# Patient Record
Sex: MALE | Race: WHITE | NOT HISPANIC OR LATINO | Employment: FULL TIME | URBAN - METROPOLITAN AREA
[De-identification: names, ages, dates, MRNs, and addresses within clinical notes are randomized per-mention and may not be internally consistent; named-entity substitution may affect disease eponyms.]

---

## 2020-12-16 ENCOUNTER — TELEPHONE (OUTPATIENT)
Dept: SCHEDULING | Facility: IMAGING CENTER | Age: 30
End: 2020-12-16

## 2021-01-05 ENCOUNTER — OFFICE VISIT (OUTPATIENT)
Dept: MEDICAL GROUP | Facility: MEDICAL CENTER | Age: 31
End: 2021-01-05
Payer: COMMERCIAL

## 2021-01-05 VITALS
RESPIRATION RATE: 17 BRPM | BODY MASS INDEX: 26.87 KG/M2 | TEMPERATURE: 98 F | WEIGHT: 198.41 LBS | SYSTOLIC BLOOD PRESSURE: 118 MMHG | OXYGEN SATURATION: 97 % | DIASTOLIC BLOOD PRESSURE: 74 MMHG | HEIGHT: 72 IN | HEART RATE: 78 BPM

## 2021-01-05 DIAGNOSIS — Z13.29 SCREENING FOR ENDOCRINE DISORDER: ICD-10-CM

## 2021-01-05 DIAGNOSIS — Z13.220 LIPID SCREENING: ICD-10-CM

## 2021-01-05 DIAGNOSIS — G47.9 SLEEP DISTURBANCE: ICD-10-CM

## 2021-01-05 DIAGNOSIS — R53.82 CHRONIC FATIGUE: ICD-10-CM

## 2021-01-05 DIAGNOSIS — F41.1 GAD (GENERALIZED ANXIETY DISORDER): ICD-10-CM

## 2021-01-05 PROCEDURE — 99204 OFFICE O/P NEW MOD 45 MIN: CPT | Performed by: NURSE PRACTITIONER

## 2021-01-05 SDOH — HEALTH STABILITY: MENTAL HEALTH: HOW OFTEN DO YOU HAVE A DRINK CONTAINING ALCOHOL?: MONTHLY OR LESS

## 2021-01-05 ASSESSMENT — ANXIETY QUESTIONNAIRES
6. BECOMING EASILY ANNOYED OR IRRITABLE: MORE THAN HALF THE DAYS
GAD7 TOTAL SCORE: 6
3. WORRYING TOO MUCH ABOUT DIFFERENT THINGS: SEVERAL DAYS
4. TROUBLE RELAXING: NOT AT ALL
5. BEING SO RESTLESS THAT IT IS HARD TO SIT STILL: NOT AT ALL
1. FEELING NERVOUS, ANXIOUS, OR ON EDGE: SEVERAL DAYS
2. NOT BEING ABLE TO STOP OR CONTROL WORRYING: SEVERAL DAYS
7. FEELING AFRAID AS IF SOMETHING AWFUL MIGHT HAPPEN: SEVERAL DAYS

## 2021-01-05 ASSESSMENT — PATIENT HEALTH QUESTIONNAIRE - PHQ9
3. TROUBLE FALLING OR STAYING ASLEEP OR SLEEPING TOO MUCH: 3
1. LITTLE INTEREST OR PLEASURE IN DOING THINGS: 1
8. MOVING OR SPEAKING SO SLOWLY THAT OTHER PEOPLE COULD HAVE NOTICED. OR THE OPPOSITE, BEING SO FIGETY OR RESTLESS THAT YOU HAVE BEEN MOVING AROUND A LOT MORE THAN USUAL: 0
7. TROUBLE CONCENTRATING ON THINGS, SUCH AS READING THE NEWSPAPER OR WATCHING TELEVISION: 0
6. FEELING BAD ABOUT YOURSELF - OR THAT YOU ARE A FAILURE OR HAVE LET YOURSELF OR YOUR FAMILY DOWN: 0
5. POOR APPETITE OR OVEREATING: 1
SUM OF ALL RESPONSES TO PHQ9 QUESTIONS 1 AND 2: 2
4. FEELING TIRED OR HAVING LITTLE ENERGY: 3
9. THOUGHTS THAT YOU WOULD BE BETTER OFF DEAD, OR OF HURTING YOURSELF: 0
SUM OF ALL RESPONSES TO PHQ QUESTIONS 1-9: 9
2. FEELING DOWN, DEPRESSED, IRRITABLE, OR HOPELESS: 1

## 2021-01-05 NOTE — ASSESSMENT & PLAN NOTE
Patient reports chronic history of anxiety dating back to early 20s.  Feels that this is stress related, job related.  He has been seeing a therapist regularly which has helped somewhat.  He prefers to avoid any medications.  No SI/HI, no history of manic behavior, no substance abuse  Patient Health Questionaire    Little interest or pleasure in doing things?: 1   Feeling down, depressed, or hopeless?: 1  Trouble falling or staying asleep, or sleeping too much? : 3  Feeling tired or having little energy? : 3  Poor appetite or overeating? : 1  Feeling bad about yourself - or that you are a failure or have let yourself or your family down? : 0  Trouble concentrating on things, such as reading the newspaper or watching television? : 0  Moving or speaking so slowly that other people could have noticed.  Or the opposite - being so fidgety or restless that you have been moving around alot more than usual. : 0  Thoughts that you would be better off dead, or of hurting yourself?: 0  Patient Health Questionnaire Score: 9    If depressive symptoms identified deferred to follow up visit unless specifically addressed in assesment and plan.    Interpretation of PHQ-9 Total Score   Score Severity   1-4 No Depression   5-9 Mild Depression   10-14 Moderate Depression   15-19 Moderately Severe Depression   20-27 Severe Depression    EDI-7 Questionnaire    Feeling nervous, anxious, or on edge: Several days  Not being able to sop or control worrying: Several days  Worrying too much about different things: Several days  Trouble relaxing: Not at all  Being so restless that it's hard to sit still: Not at all  Becoming easily annoyed or irritable: More than half the days  Feeling afraid as if something awful might happen: Several days  Total: 6    Interpretation of EDI 7 Total Score   Score Severity :  0-4 No Anxiety   5-9 Mild Anxiety  10-14 Moderate Anxiety  15-21 Severe Anxiety

## 2021-01-05 NOTE — ASSESSMENT & PLAN NOTE
"Patient reports chronic difficulty with sleep.  Tends to wake up multiple times through the night, sometimes his mind will start \"racing\" during the night and then he is unable to get back to sleep.  Possible this is anxiety related.  He has tried taking melatonin but found this to be mainly ineffective.  He prefers to avoid any medications  "

## 2021-01-05 NOTE — ASSESSMENT & PLAN NOTE
"Longstanding difficulty with fatigue, \"as long as I can remember\".  Constantly tired, never feels fully rested.  He does not sleep well and tends to wake up throughout the night which could be a contributing factor.  However, he denies any symptoms of sleep apnea including significant snoring, waking up gasping, waking up with his heart racing  He does report that he struggled with mild depression and anxiety for years as well, he acknowledges this could be a contributing factor.  He essentially wants to have some lab work done to rule out any other underlying etiology.  He has not had thyroid or testosterone testing in the past  No family history of thyroid disorder.  No sudden weight gain, constipation, heat or cold intolerance.  No muscle atrophy, loss of libido  "

## 2021-01-05 NOTE — PROGRESS NOTES
Chief Complaint   Patient presents with   • Establish Care     NP   • Requesting Labs     constant fatigue     Lele Salcedo is a 30 y.o. male here to establish care.  He is working for a  with [employer], lives with his jessicae    EDI (generalized anxiety disorder)  Patient reports chronic history of anxiety dating back to early 20s.  Feels that this is stress related, job related.  He has been seeing a therapist regularly which has helped somewhat.  He prefers to avoid any medications.  No SI/HI, no history of manic behavior, no substance abuse  Patient Health Questionaire    Little interest or pleasure in doing things?: 1   Feeling down, depressed, or hopeless?: 1  Trouble falling or staying asleep, or sleeping too much? : 3  Feeling tired or having little energy? : 3  Poor appetite or overeating? : 1  Feeling bad about yourself - or that you are a failure or have let yourself or your family down? : 0  Trouble concentrating on things, such as reading the newspaper or watching television? : 0  Moving or speaking so slowly that other people could have noticed.  Or the opposite - being so fidgety or restless that you have been moving around alot more than usual. : 0  Thoughts that you would be better off dead, or of hurting yourself?: 0  Patient Health Questionnaire Score: 9    If depressive symptoms identified deferred to follow up visit unless specifically addressed in assesment and plan.    Interpretation of PHQ-9 Total Score   Score Severity   1-4 No Depression   5-9 Mild Depression   10-14 Moderate Depression   15-19 Moderately Severe Depression   20-27 Severe Depression    EDI-7 Questionnaire    Feeling nervous, anxious, or on edge: Several days  Not being able to sop or control worrying: Several days  Worrying too much about different things: Several days  Trouble relaxing: Not at all  Being so restless that it's hard to sit still: Not at all  Becoming easily annoyed or irritable: More than  "half the days  Feeling afraid as if something awful might happen: Several days  Total: 6    Interpretation of EDI 7 Total Score   Score Severity :  0-4 No Anxiety   5-9 Mild Anxiety  10-14 Moderate Anxiety  15-21 Severe Anxiety        Chronic fatigue  Longstanding difficulty with fatigue, \"as long as I can remember\".  Constantly tired, never feels fully rested.  He does not sleep well and tends to wake up throughout the night which could be a contributing factor.  However, he denies any symptoms of sleep apnea including significant snoring, waking up gasping, waking up with his heart racing  He does report that he struggled with mild depression and anxiety for years as well, he acknowledges this could be a contributing factor.  He essentially wants to have some lab work done to rule out any other underlying etiology.  He has not had thyroid or testosterone testing in the past  No family history of thyroid disorder.  No sudden weight gain, constipation, heat or cold intolerance.  No muscle atrophy, loss of libido  No history of anemia.  Denies frequent headaches, muscle or joint pain, rash, unexplained fever    Sleep disturbance  Patient reports chronic difficulty with sleep.  Tends to wake up multiple times through the night, sometimes his mind will start \"racing\" during the night and then he is unable to get back to sleep.  Possible this is anxiety related.  He has tried taking melatonin but found this to be mainly ineffective.  He prefers to avoid any medications    Current medicines (including changes today)  No current outpatient medications on file.     No current facility-administered medications for this visit.      He  has no past medical history on file.  He  has a past surgical history that includes wrist arthroscopy (Right).  Social History     Tobacco Use   • Smoking status: Never Smoker   • Smokeless tobacco: Never Used   Substance Use Topics   • Alcohol use: Yes     Frequency: Monthly or less   • Drug " use: Yes     Types: Marijuana     Social History     Social History Narrative   • Not on file     Family History   Problem Relation Age of Onset   • No Known Problems Father    • Diabetes Maternal Grandfather      Family Status   Relation Name Status   • Mo  Alive   • Fa  (Not Specified)   • MGFa  Alive         ROS  Problems listed discussed above, all other systems reviewed and negative     Objective:     /74 (BP Location: Left arm, Patient Position: Sitting, BP Cuff Size: Adult)   Pulse 78   Temp 36.7 °C (98 °F) (Temporal)   Resp 17   Ht 1.829 m (6')   Wt 90 kg (198 lb 6.6 oz)   SpO2 97%  Body mass index is 26.91 kg/m².  Physical Exam:  General: Alert, oriented in no acute distress.  Eye contact is good, speech is normal, affect calm  HEENT: =TMs gray with good landmarks bilaterally. No cervical or supraclavicular lymphadenopathy, thyroid isthmus palpable without masses or nodules.  Lungs: clear to auscultation bilaterally, good aeration, normal effort. No wheeze/ rhonchi/ rales.  CV: regular rate and rhythm, S1, S2. No murmur, no JVD, no edema. Pedal pulses 2 + bilaterally  Abdomen: soft, nontender, BS x4.  Ext: color normal, vascularity normal, temperature normal. No rash or lesions.    Assessment and Plan:   The following treatment plan was discussed   1. Chronic fatigue   this is a moderately complex problem with many potential underlying causes.  We have discussed sleep apnea of which he denies any symptoms.  Anxiety and depression could also be a contributing factor.  At this time we will obtain labs to screen for underlying endocrine disorder or vitamin deficiency  TSH WITH REFLEX TO FT4    VITAMIN D,25 HYDROXY    Comp Metabolic Panel    TESTOSTERONE SERUM    CBC WITH DIFFERENTIAL   2. EDI (generalized anxiety disorder)   chronic issue, currently seeing a therapist.  He prefers to avoid medication at this time.  Continue to monitor.   3. Sleep disturbance   waking frequently through the night,  often having difficulty getting back to sleep.  He has tried melatonin without much benefit.  This is likely anxiety related.   4. Screening for endocrine disorder  TESTOSTERONE SERUM   5. Lipid screening  Lipid Profile       Followup: Pending labs           Please note that this dictation was created using voice recognition software. I have worked with consultants from the vendor as well as technical experts from [facility] to optimize the interface. I have made every reasonable attempt to correct obvious errors, but I expect that there are errors of grammar and possibly content that I did not discover before finalizing the note.

## 2021-02-11 ENCOUNTER — HOSPITAL ENCOUNTER (OUTPATIENT)
Dept: LAB | Facility: MEDICAL CENTER | Age: 31
End: 2021-02-11
Attending: NURSE PRACTITIONER
Payer: COMMERCIAL

## 2021-02-11 DIAGNOSIS — Z13.220 LIPID SCREENING: ICD-10-CM

## 2021-02-11 DIAGNOSIS — R53.82 CHRONIC FATIGUE: ICD-10-CM

## 2021-02-11 DIAGNOSIS — Z13.29 SCREENING FOR ENDOCRINE DISORDER: ICD-10-CM

## 2021-02-11 LAB
ALBUMIN SERPL BCP-MCNC: 4.7 G/DL (ref 3.2–4.9)
ALBUMIN/GLOB SERPL: 1.7 G/DL
ALP SERPL-CCNC: 90 U/L (ref 30–99)
ALT SERPL-CCNC: 29 U/L (ref 2–50)
ANION GAP SERPL CALC-SCNC: 13 MMOL/L (ref 7–16)
AST SERPL-CCNC: 21 U/L (ref 12–45)
BASOPHILS # BLD AUTO: 1.3 % (ref 0–1.8)
BASOPHILS # BLD: 0.09 K/UL (ref 0–0.12)
BILIRUB SERPL-MCNC: 0.4 MG/DL (ref 0.1–1.5)
BUN SERPL-MCNC: 14 MG/DL (ref 8–22)
CALCIUM SERPL-MCNC: 9.4 MG/DL (ref 8.4–10.2)
CHLORIDE SERPL-SCNC: 105 MMOL/L (ref 96–112)
CHOLEST SERPL-MCNC: 130 MG/DL (ref 100–199)
CO2 SERPL-SCNC: 22 MMOL/L (ref 20–33)
CREAT SERPL-MCNC: 0.92 MG/DL (ref 0.5–1.4)
EOSINOPHIL # BLD AUTO: 0.15 K/UL (ref 0–0.51)
EOSINOPHIL NFR BLD: 2.2 % (ref 0–6.9)
ERYTHROCYTE [DISTWIDTH] IN BLOOD BY AUTOMATED COUNT: 39.1 FL (ref 35.9–50)
FASTING STATUS PATIENT QL REPORTED: NORMAL
GLOBULIN SER CALC-MCNC: 2.7 G/DL (ref 1.9–3.5)
GLUCOSE SERPL-MCNC: 96 MG/DL (ref 65–99)
HCT VFR BLD AUTO: 45.5 % (ref 42–52)
HDLC SERPL-MCNC: 23 MG/DL
HGB BLD-MCNC: 15.5 G/DL (ref 14–18)
IMM GRANULOCYTES # BLD AUTO: 0.02 K/UL (ref 0–0.11)
IMM GRANULOCYTES NFR BLD AUTO: 0.3 % (ref 0–0.9)
LDLC SERPL CALC-MCNC: 85 MG/DL
LYMPHOCYTES # BLD AUTO: 1.82 K/UL (ref 1–4.8)
LYMPHOCYTES NFR BLD: 26.2 % (ref 22–41)
MCH RBC QN AUTO: 29.4 PG (ref 27–33)
MCHC RBC AUTO-ENTMCNC: 34.1 G/DL (ref 33.7–35.3)
MCV RBC AUTO: 86.3 FL (ref 81.4–97.8)
MONOCYTES # BLD AUTO: 0.39 K/UL (ref 0–0.85)
MONOCYTES NFR BLD AUTO: 5.6 % (ref 0–13.4)
NEUTROPHILS # BLD AUTO: 4.47 K/UL (ref 1.82–7.42)
NEUTROPHILS NFR BLD: 64.4 % (ref 44–72)
NRBC # BLD AUTO: 0 K/UL
NRBC BLD-RTO: 0 /100 WBC
PLATELET # BLD AUTO: 303 K/UL (ref 164–446)
PMV BLD AUTO: 9.4 FL (ref 9–12.9)
POTASSIUM SERPL-SCNC: 3.9 MMOL/L (ref 3.6–5.5)
PROT SERPL-MCNC: 7.4 G/DL (ref 6–8.2)
RBC # BLD AUTO: 5.27 M/UL (ref 4.7–6.1)
SODIUM SERPL-SCNC: 140 MMOL/L (ref 135–145)
TRIGL SERPL-MCNC: 111 MG/DL (ref 0–149)
TSH SERPL DL<=0.005 MIU/L-ACNC: 1.01 UIU/ML (ref 0.38–5.33)
WBC # BLD AUTO: 6.9 K/UL (ref 4.8–10.8)

## 2021-02-11 PROCEDURE — 84403 ASSAY OF TOTAL TESTOSTERONE: CPT

## 2021-02-11 PROCEDURE — 36415 COLL VENOUS BLD VENIPUNCTURE: CPT

## 2021-02-11 PROCEDURE — 84443 ASSAY THYROID STIM HORMONE: CPT

## 2021-02-11 PROCEDURE — 82306 VITAMIN D 25 HYDROXY: CPT

## 2021-02-11 PROCEDURE — 80053 COMPREHEN METABOLIC PANEL: CPT

## 2021-02-11 PROCEDURE — 80061 LIPID PANEL: CPT

## 2021-02-11 PROCEDURE — 85025 COMPLETE CBC W/AUTO DIFF WBC: CPT

## 2021-02-12 LAB
25(OH)D3 SERPL-MCNC: 19 NG/ML (ref 30–100)
TESTOST SERPL-MCNC: 423 NG/DL (ref 175–781)

## 2021-02-17 ENCOUNTER — TELEPHONE (OUTPATIENT)
Dept: MEDICAL GROUP | Facility: MEDICAL CENTER | Age: 31
End: 2021-02-17

## 2021-02-17 RX ORDER — ERGOCALCIFEROL 1.25 MG/1
50000 CAPSULE ORAL
Qty: 12 CAPSULE | Refills: 0 | Status: SHIPPED | OUTPATIENT
Start: 2021-02-17 | End: 2023-09-22

## 2021-02-17 NOTE — TELEPHONE ENCOUNTER
Phone Number Called: [phone] (home)     Call outcome: Spoke to patient regarding message below.    Message: pt called back to update pharmacy and to receive verbal lab results pt expressed understanding and had no further questions. Pharmacy updated in chart

## 2022-08-23 ENCOUNTER — OFFICE VISIT (OUTPATIENT)
Dept: URGENT CARE | Facility: PHYSICIAN GROUP | Age: 32
End: 2022-08-23
Payer: COMMERCIAL

## 2022-08-23 VITALS
BODY MASS INDEX: 25.19 KG/M2 | DIASTOLIC BLOOD PRESSURE: 70 MMHG | RESPIRATION RATE: 14 BRPM | WEIGHT: 186 LBS | SYSTOLIC BLOOD PRESSURE: 112 MMHG | TEMPERATURE: 97.9 F | OXYGEN SATURATION: 96 % | HEART RATE: 77 BPM | HEIGHT: 72 IN

## 2022-08-23 DIAGNOSIS — N30.01 ACUTE CYSTITIS WITH HEMATURIA: ICD-10-CM

## 2022-08-23 DIAGNOSIS — R30.0 DYSURIA: ICD-10-CM

## 2022-08-23 LAB
APPEARANCE UR: CLEAR
BILIRUB UR STRIP-MCNC: NEGATIVE MG/DL
COLOR UR AUTO: NORMAL
GLUCOSE UR STRIP.AUTO-MCNC: NEGATIVE MG/DL
KETONES UR STRIP.AUTO-MCNC: NEGATIVE MG/DL
LEUKOCYTE ESTERASE UR QL STRIP.AUTO: NEGATIVE
NITRITE UR QL STRIP.AUTO: NEGATIVE
PH UR STRIP.AUTO: 7 [PH] (ref 5–8)
PROT UR QL STRIP: NEGATIVE MG/DL
RBC UR QL AUTO: NORMAL
SP GR UR STRIP.AUTO: 1.01
UROBILINOGEN UR STRIP-MCNC: 0.2 MG/DL

## 2022-08-23 PROCEDURE — 81002 URINALYSIS NONAUTO W/O SCOPE: CPT | Performed by: NURSE PRACTITIONER

## 2022-08-23 PROCEDURE — 99213 OFFICE O/P EST LOW 20 MIN: CPT | Performed by: NURSE PRACTITIONER

## 2022-08-23 RX ORDER — PHENAZOPYRIDINE HYDROCHLORIDE 200 MG/1
200 TABLET, FILM COATED ORAL 3 TIMES DAILY
Qty: 6 TABLET | Refills: 0 | Status: SHIPPED | OUTPATIENT
Start: 2022-08-23 | End: 2022-08-25

## 2022-08-23 RX ORDER — CIPROFLOXACIN 500 MG/1
500 TABLET, FILM COATED ORAL EVERY 12 HOURS
Qty: 14 TABLET | Refills: 0 | Status: SHIPPED | OUTPATIENT
Start: 2022-08-23 | End: 2022-08-30

## 2022-08-23 ASSESSMENT — ENCOUNTER SYMPTOMS
WEAKNESS: 0
FLANK PAIN: 0
FEVER: 0
CONSTIPATION: 0
VOMITING: 0
BACK PAIN: 0
NAUSEA: 0
HEADACHES: 0
DIZZINESS: 0
MYALGIAS: 0
ABDOMINAL PAIN: 0
CHILLS: 0
DIARRHEA: 0

## 2022-08-23 ASSESSMENT — FIBROSIS 4 INDEX: FIB4 SCORE: 0.42

## 2022-08-23 NOTE — PROGRESS NOTES
Subjective     Lele Salcedo is a 32 y.o. male who presents with UTI (Freq urination, small amount of urine, x2 days )            HPI  States has been experiencing dysuria, urgency, frequency x2 days.  Denies fever, nausea vomiting back pain or hematuria.  Denies discharge from penis, scrotum swelling or pain.  Denies rash or redness at tip of penis.  Last sexual intercourse 4 months ago, no concern for STD at this time.  Not prone to urinary tract infections.    PMH:  has no past medical history on file.  MEDS:   Current Outpatient Medications:     ciprofloxacin (CIPRO) 500 MG Tab, Take 1 Tablet by mouth every 12 hours for 7 days., Disp: 14 Tablet, Rfl: 0    phenazopyridine (PYRIDIUM) 200 MG Tab, Take 1 Tablet by mouth 3 times a day for 2 days., Disp: 6 Tablet, Rfl: 0    vitamin D, Ergocalciferol, (DRISDOL) 1.25 MG (47607 UT) Cap capsule, Take 1 capsule by mouth every 7 days., Disp: 12 capsule, Rfl: 0  ALLERGIES:   Allergies   Allergen Reactions    Sulfa Drugs      SURGHX:   Past Surgical History:   Procedure Laterality Date    WRIST ARTHROSCOPY Right      SOCHX:  reports that he has never smoked. He has never used smokeless tobacco. He reports current alcohol use. He reports current drug use. Drug: Marijuana.  FH: Family history was reviewed, no pertinent findings to report    Review of Systems   Constitutional:  Negative for chills, fever and malaise/fatigue.   Gastrointestinal:  Negative for abdominal pain, constipation, diarrhea, nausea and vomiting.   Genitourinary:  Positive for dysuria, frequency and urgency. Negative for flank pain and hematuria.   Musculoskeletal:  Negative for back pain and myalgias.   Skin:  Negative for itching and rash.   Neurological:  Negative for dizziness, weakness and headaches.   All other systems reviewed and are negative.           Objective     /70 (BP Location: Right arm, Patient Position: Sitting, BP Cuff Size: Adult)   Pulse 77   Temp 36.6 °C (97.9 °F) (Temporal)    Resp 14   Ht 1.829 m (6')   Wt 84.4 kg (186 lb)   SpO2 96%   BMI 25.23 kg/m²      Physical Exam  Vitals reviewed.   Constitutional:       General: He is awake. He is not in acute distress.     Appearance: Normal appearance. He is well-developed. He is not ill-appearing, toxic-appearing or diaphoretic.   HENT:      Head: Normocephalic.   Cardiovascular:      Rate and Rhythm: Normal rate.   Pulmonary:      Effort: Pulmonary effort is normal.   Abdominal:      General: There is no distension.      Palpations: Abdomen is soft.      Tenderness: There is no abdominal tenderness. There is no guarding or rebound.   Musculoskeletal:         General: Normal range of motion.   Skin:     General: Skin is warm and dry.   Neurological:      Mental Status: He is alert and oriented to person, place, and time.   Psychiatric:         Attention and Perception: Attention normal.         Mood and Affect: Mood normal.         Speech: Speech normal.         Behavior: Behavior normal. Behavior is cooperative.                           Assessment & Plan        1. Dysuria    - POCT Urinalysis  - phenazopyridine (PYRIDIUM) 200 MG Tab; Take 1 Tablet by mouth 3 times a day for 2 days.  Dispense: 6 Tablet; Refill: 0    2. Acute cystitis with hematuria    - ciprofloxacin (CIPRO) 500 MG Tab; Take 1 Tablet by mouth every 12 hours for 7 days.  Dispense: 14 Tablet; Refill: 0     -Increase water intake  -Urinate more frequently and empty bladder completely  -Practice good toileting hygiene after urination and sexual intercourse, refrain from sexual intercourse until infection cleared  -Monitor for back/flank pain, difficulty urinating, blood in urine- need re-evaluation

## 2022-09-09 ENCOUNTER — HOSPITAL ENCOUNTER (OUTPATIENT)
Facility: MEDICAL CENTER | Age: 32
End: 2022-09-09
Payer: COMMERCIAL

## 2022-09-09 ENCOUNTER — OFFICE VISIT (OUTPATIENT)
Dept: URGENT CARE | Facility: PHYSICIAN GROUP | Age: 32
End: 2022-09-09
Payer: COMMERCIAL

## 2022-09-09 VITALS
HEIGHT: 72 IN | OXYGEN SATURATION: 96 % | TEMPERATURE: 97.4 F | WEIGHT: 195 LBS | HEART RATE: 74 BPM | RESPIRATION RATE: 16 BRPM | DIASTOLIC BLOOD PRESSURE: 68 MMHG | SYSTOLIC BLOOD PRESSURE: 115 MMHG | BODY MASS INDEX: 26.41 KG/M2

## 2022-09-09 DIAGNOSIS — R30.0 DYSURIA: ICD-10-CM

## 2022-09-09 PROBLEM — G47.00 INSOMNIA: Status: ACTIVE | Noted: 2018-03-07

## 2022-09-09 LAB
APPEARANCE UR: CLEAR
BILIRUB UR STRIP-MCNC: NORMAL MG/DL
COLOR UR AUTO: YELLOW
GLUCOSE UR STRIP.AUTO-MCNC: NORMAL MG/DL
KETONES UR STRIP.AUTO-MCNC: NORMAL MG/DL
LEUKOCYTE ESTERASE UR QL STRIP.AUTO: NORMAL
NITRITE UR QL STRIP.AUTO: NORMAL
PH UR STRIP.AUTO: 7 [PH] (ref 5–8)
PROT UR QL STRIP: NORMAL MG/DL
RBC UR QL AUTO: NORMAL
SP GR UR STRIP.AUTO: 10.1
UROBILINOGEN UR STRIP-MCNC: 0.2 MG/DL

## 2022-09-09 PROCEDURE — 99213 OFFICE O/P EST LOW 20 MIN: CPT

## 2022-09-09 PROCEDURE — 87086 URINE CULTURE/COLONY COUNT: CPT

## 2022-09-09 PROCEDURE — 81002 URINALYSIS NONAUTO W/O SCOPE: CPT

## 2022-09-09 RX ORDER — PHENAZOPYRIDINE HYDROCHLORIDE 200 MG/1
200 TABLET, FILM COATED ORAL 3 TIMES DAILY PRN
Qty: 6 TABLET | Refills: 0 | Status: SHIPPED | OUTPATIENT
Start: 2022-09-09 | End: 2023-07-27

## 2022-09-09 RX ORDER — CIPROFLOXACIN 500 MG/1
500 TABLET, FILM COATED ORAL 2 TIMES DAILY
Qty: 14 TABLET | Refills: 0 | Status: SHIPPED | OUTPATIENT
Start: 2022-09-09 | End: 2022-09-16

## 2022-09-09 RX ORDER — IBUPROFEN 600 MG/1
600 TABLET ORAL EVERY 6 HOURS PRN
Qty: 30 TABLET | Refills: 0 | Status: SHIPPED | OUTPATIENT
Start: 2022-09-09

## 2022-09-09 ASSESSMENT — FIBROSIS 4 INDEX: FIB4 SCORE: 0.42

## 2022-09-09 NOTE — PROGRESS NOTES
Subjective:   Lele Salcedo is a 32 y.o. male who presents for UTI (Started 2-3 days ago. )      HPI:  Treated for UTI 2 weeks ago with Cipro. Patient reports 2-3 days of symptom relief.   Started having gradual resumption: suprapubic pain, penis discomfort, urinary frequency,   Difficulty sleeping due to need to urinate  AZO and increased not helping.   Has history of kidney stones.  Denies sexual activity.  Denies fever, chills, nausea, vomiting, diarrhea  Denies scrotum pain.   Patient had vasectomy in April 2022, established with urologist. Patient denies post-op complications. Per patient, he denies post-op follow up, however, he states he did have viable sperm on microscope following procedure.    ROS as above per HPI    Medications:    Current Outpatient Medications on File Prior to Visit   Medication Sig Dispense Refill    vitamin D, Ergocalciferol, (DRISDOL) 1.25 MG (47520 UT) Cap capsule Take 1 capsule by mouth every 7 days. 12 capsule 0     No current facility-administered medications on file prior to visit.        Allergies:   Sulfa drugs    Problem List:   Patient Active Problem List   Diagnosis    Chronic fatigue    EDI (generalized anxiety disorder)    Sleep disturbance    Insomnia        Surgical History:  Past Surgical History:   Procedure Laterality Date    WRIST ARTHROSCOPY Right        Past Social Hx:   Social History     Tobacco Use    Smoking status: Never    Smokeless tobacco: Never   Vaping Use    Vaping Use: Never used   Substance Use Topics    Alcohol use: Yes    Drug use: Yes     Types: Marijuana          Problem list, medications, and allergies reviewed by myself today in Epic.     Objective:     /68 (BP Location: Left arm, Patient Position: Sitting, BP Cuff Size: Adult)   Pulse 74   Temp 36.3 °C (97.4 °F) (Temporal)   Resp 16   Ht 1.829 m (6')   Wt 88.5 kg (195 lb)   SpO2 96%   BMI 26.45 kg/m²     Physical Exam  Vitals and nursing note reviewed.   Constitutional:        General: He is not in acute distress.     Appearance: Normal appearance. He is not ill-appearing or diaphoretic.   HENT:      Head: Normocephalic and atraumatic.   Eyes:      Conjunctiva/sclera: Conjunctivae normal.      Pupils: Pupils are equal, round, and reactive to light.   Cardiovascular:      Rate and Rhythm: Normal rate and regular rhythm.      Heart sounds: Normal heart sounds.   Pulmonary:      Breath sounds: Normal breath sounds.   Abdominal:      General: Bowel sounds are normal.      Palpations: Abdomen is soft.      Tenderness: There is no right CVA tenderness or left CVA tenderness.   Genitourinary:     Penis: Normal and circumcised. No erythema, tenderness, discharge or swelling.       Testes: Normal.         Right: Tenderness or swelling not present.         Left: Tenderness or swelling not present.      Epididymis:      Right: Normal.      Left: Normal.   Skin:     General: Skin is warm and dry.      Findings: No rash.   Neurological:      Mental Status: He is alert and oriented to person, place, and time.     Results for orders placed or performed in visit on 09/09/22   POCT Urinalysis   Result Value Ref Range    POC Color YELLOW Negative    POC Appearance CLEAR Negative    POC Leukocyte Esterase NEG Negative    POC Nitrites NEG Negative    POC Urobiligen 0.2 Negative (0.2) mg/dL    POC Protein NEG Negative mg/dL    POC Urine PH 7.0 5.0 - 8.0    POC Blood TRACE-INTACT Negative    POC Specific Gravity 10.10 <1.005 - >1.030    POC Ketones NEG Negative mg/dL    POC Bilirubin NEG Negative mg/dL    POC Glucose NEG Negative mg/dL         Assessment/Plan:     Diagnosis and associated orders:   1. Dysuria  - POCT Urinalysis  - URINE CULTURE(NEW); Future  - ciprofloxacin (CIPRO) 500 MG Tab; Take 1 Tablet by mouth 2 times a day for 7 days.  Dispense: 14 Tablet; Refill: 0  - phenazopyridine (PYRIDIUM) 200 MG Tab; Take 1 Tablet by mouth 3 times a day as needed for Severe Pain.  Dispense: 6 Tablet; Refill: 0  -  ibuprofen (MOTRIN) 600 MG Tab; Take 1 Tablet by mouth every 6 hours as needed for Inflammation.  Dispense: 30 Tablet; Refill: 0      Comments/MDM:     UA obtained in urgent care today is positive for trace amounts of blood.  Negative for leukocytes and nitrites.  Will start patient on Cipro today and send urine out for culture.  Patient is to follow-up with his urologist regarding urinary complaints following vasectomy performed in April 2022.  Supportive measures otherwise encouraged.  Patient to follow-up with his primary care provider and to return to urgent care as needed.        Pt is clinically stable at today's acute urgent care visit.  No acute distress noted. Appropriate for outpatient management at this time.       Discussed DDx, management options (risks,benefits, and alternatives to planned treatment), natural progression and supportive care.  Expressed understanding and the treatment plan was agreed upon. Questions were encouraged and answered   Return to urgent care prn if new or worsening sx or if there is no improvement in condition prn.    Educated in Red flags and indications to immediately call 911 or present to the Emergency Department.   Advised the patient to follow-up with the primary care physician for recheck, reevaluation, and consideration of further management.      Please note that this dictation was created using voice recognition software. I have made a reasonable attempt to correct obvious errors, but I expect that there are errors of grammar and possibly content that I did not discover before finalizing the note.    This note was electronically signed by Harmony Caldera DNP

## 2022-09-11 LAB
BACTERIA UR CULT: NORMAL
SIGNIFICANT IND 70042: NORMAL
SITE SITE: NORMAL
SOURCE SOURCE: NORMAL

## 2023-07-27 ENCOUNTER — OFFICE VISIT (OUTPATIENT)
Dept: MEDICAL GROUP | Facility: PHYSICIAN GROUP | Age: 33
End: 2023-07-27
Payer: COMMERCIAL

## 2023-07-27 VITALS
HEART RATE: 83 BPM | TEMPERATURE: 97.7 F | SYSTOLIC BLOOD PRESSURE: 98 MMHG | WEIGHT: 191.1 LBS | HEIGHT: 72 IN | BODY MASS INDEX: 25.88 KG/M2 | OXYGEN SATURATION: 99 % | DIASTOLIC BLOOD PRESSURE: 60 MMHG

## 2023-07-27 DIAGNOSIS — Z23 NEED FOR VACCINATION: ICD-10-CM

## 2023-07-27 DIAGNOSIS — Z13.29 SCREENING FOR THYROID DISORDER: ICD-10-CM

## 2023-07-27 DIAGNOSIS — Z13.220 ENCOUNTER FOR SCREENING FOR LIPID DISORDER: ICD-10-CM

## 2023-07-27 DIAGNOSIS — Z13.21 ENCOUNTER FOR VITAMIN DEFICIENCY SCREENING: ICD-10-CM

## 2023-07-27 DIAGNOSIS — E55.9 VITAMIN D DEFICIENCY: ICD-10-CM

## 2023-07-27 DIAGNOSIS — Z01.84 IMMUNITY STATUS TESTING: ICD-10-CM

## 2023-07-27 DIAGNOSIS — G43.109 MIGRAINE WITH AURA AND WITHOUT STATUS MIGRAINOSUS, NOT INTRACTABLE: ICD-10-CM

## 2023-07-27 DIAGNOSIS — Z13.1 ENCOUNTER FOR SCREENING FOR DIABETES MELLITUS: ICD-10-CM

## 2023-07-27 PROCEDURE — 3078F DIAST BP <80 MM HG: CPT | Performed by: NURSE PRACTITIONER

## 2023-07-27 PROCEDURE — 99214 OFFICE O/P EST MOD 30 MIN: CPT | Performed by: NURSE PRACTITIONER

## 2023-07-27 PROCEDURE — 3074F SYST BP LT 130 MM HG: CPT | Performed by: NURSE PRACTITIONER

## 2023-07-27 SDOH — ECONOMIC STABILITY: INCOME INSECURITY: IN THE LAST 12 MONTHS, WAS THERE A TIME WHEN YOU WERE NOT ABLE TO PAY THE MORTGAGE OR RENT ON TIME?: NO

## 2023-07-27 SDOH — ECONOMIC STABILITY: HOUSING INSECURITY
IN THE LAST 12 MONTHS, WAS THERE A TIME WHEN YOU DID NOT HAVE A STEADY PLACE TO SLEEP OR SLEPT IN A SHELTER (INCLUDING NOW)?: NO

## 2023-07-27 SDOH — HEALTH STABILITY: MENTAL HEALTH
STRESS IS WHEN SOMEONE FEELS TENSE, NERVOUS, ANXIOUS, OR CAN'T SLEEP AT NIGHT BECAUSE THEIR MIND IS TROUBLED. HOW STRESSED ARE YOU?: TO SOME EXTENT

## 2023-07-27 SDOH — ECONOMIC STABILITY: INCOME INSECURITY: HOW HARD IS IT FOR YOU TO PAY FOR THE VERY BASICS LIKE FOOD, HOUSING, MEDICAL CARE, AND HEATING?: SOMEWHAT HARD

## 2023-07-27 SDOH — ECONOMIC STABILITY: HOUSING INSECURITY: IN THE LAST 12 MONTHS, HOW MANY PLACES HAVE YOU LIVED?: 1

## 2023-07-27 SDOH — HEALTH STABILITY: PHYSICAL HEALTH: ON AVERAGE, HOW MANY DAYS PER WEEK DO YOU ENGAGE IN MODERATE TO STRENUOUS EXERCISE (LIKE A BRISK WALK)?: 7 DAYS

## 2023-07-27 SDOH — ECONOMIC STABILITY: TRANSPORTATION INSECURITY
IN THE PAST 12 MONTHS, HAS LACK OF RELIABLE TRANSPORTATION KEPT YOU FROM MEDICAL APPOINTMENTS, MEETINGS, WORK OR FROM GETTING THINGS NEEDED FOR DAILY LIVING?: NO

## 2023-07-27 SDOH — ECONOMIC STABILITY: FOOD INSECURITY: WITHIN THE PAST 12 MONTHS, THE FOOD YOU BOUGHT JUST DIDN'T LAST AND YOU DIDN'T HAVE MONEY TO GET MORE.: NEVER TRUE

## 2023-07-27 SDOH — ECONOMIC STABILITY: FOOD INSECURITY: WITHIN THE PAST 12 MONTHS, YOU WORRIED THAT YOUR FOOD WOULD RUN OUT BEFORE YOU GOT MONEY TO BUY MORE.: NEVER TRUE

## 2023-07-27 SDOH — HEALTH STABILITY: PHYSICAL HEALTH: ON AVERAGE, HOW MANY MINUTES DO YOU ENGAGE IN EXERCISE AT THIS LEVEL?: 20 MIN

## 2023-07-27 SDOH — ECONOMIC STABILITY: TRANSPORTATION INSECURITY
IN THE PAST 12 MONTHS, HAS LACK OF TRANSPORTATION KEPT YOU FROM MEETINGS, WORK, OR FROM GETTING THINGS NEEDED FOR DAILY LIVING?: NO

## 2023-07-27 SDOH — ECONOMIC STABILITY: TRANSPORTATION INSECURITY
IN THE PAST 12 MONTHS, HAS THE LACK OF TRANSPORTATION KEPT YOU FROM MEDICAL APPOINTMENTS OR FROM GETTING MEDICATIONS?: NO

## 2023-07-27 ASSESSMENT — SOCIAL DETERMINANTS OF HEALTH (SDOH)
IN A TYPICAL WEEK, HOW MANY TIMES DO YOU TALK ON THE PHONE WITH FAMILY, FRIENDS, OR NEIGHBORS?: ONCE A WEEK
ARE YOU MARRIED, WIDOWED, DIVORCED, SEPARATED, NEVER MARRIED, OR LIVING WITH A PARTNER?: NEVER MARRIED
HOW OFTEN DO YOU HAVE SIX OR MORE DRINKS ON ONE OCCASION: LESS THAN MONTHLY
DO YOU BELONG TO ANY CLUBS OR ORGANIZATIONS SUCH AS CHURCH GROUPS UNIONS, FRATERNAL OR ATHLETIC GROUPS, OR SCHOOL GROUPS?: NO
DO YOU BELONG TO ANY CLUBS OR ORGANIZATIONS SUCH AS CHURCH GROUPS UNIONS, FRATERNAL OR ATHLETIC GROUPS, OR SCHOOL GROUPS?: NO
HOW OFTEN DO YOU GET TOGETHER WITH FRIENDS OR RELATIVES?: ONCE A WEEK
HOW OFTEN DO YOU ATTEND CHURCH OR RELIGIOUS SERVICES?: NEVER
ARE YOU MARRIED, WIDOWED, DIVORCED, SEPARATED, NEVER MARRIED, OR LIVING WITH A PARTNER?: NEVER MARRIED
WITHIN THE PAST 12 MONTHS, YOU WORRIED THAT YOUR FOOD WOULD RUN OUT BEFORE YOU GOT THE MONEY TO BUY MORE: NEVER TRUE
HOW OFTEN DO YOU ATTENT MEETINGS OF THE CLUB OR ORGANIZATION YOU BELONG TO?: NEVER
IN A TYPICAL WEEK, HOW MANY TIMES DO YOU TALK ON THE PHONE WITH FAMILY, FRIENDS, OR NEIGHBORS?: ONCE A WEEK
HOW OFTEN DO YOU HAVE A DRINK CONTAINING ALCOHOL: 2-4 TIMES A MONTH
HOW OFTEN DO YOU ATTENT MEETINGS OF THE CLUB OR ORGANIZATION YOU BELONG TO?: NEVER
HOW OFTEN DO YOU ATTEND CHURCH OR RELIGIOUS SERVICES?: NEVER
HOW OFTEN DO YOU GET TOGETHER WITH FRIENDS OR RELATIVES?: ONCE A WEEK
HOW HARD IS IT FOR YOU TO PAY FOR THE VERY BASICS LIKE FOOD, HOUSING, MEDICAL CARE, AND HEATING?: SOMEWHAT HARD
HOW MANY DRINKS CONTAINING ALCOHOL DO YOU HAVE ON A TYPICAL DAY WHEN YOU ARE DRINKING: 5 OR 6

## 2023-07-27 ASSESSMENT — PATIENT HEALTH QUESTIONNAIRE - PHQ9
CLINICAL INTERPRETATION OF PHQ2 SCORE: 2
SUM OF ALL RESPONSES TO PHQ QUESTIONS 1-9: 5
5. POOR APPETITE OR OVEREATING: 0 - NOT AT ALL

## 2023-07-27 ASSESSMENT — LIFESTYLE VARIABLES
SKIP TO QUESTIONS 9-10: 0
AUDIT-C TOTAL SCORE: 5
HOW MANY STANDARD DRINKS CONTAINING ALCOHOL DO YOU HAVE ON A TYPICAL DAY: 5 OR 6
HOW OFTEN DO YOU HAVE A DRINK CONTAINING ALCOHOL: 2-4 TIMES A MONTH
HOW OFTEN DO YOU HAVE SIX OR MORE DRINKS ON ONE OCCASION: LESS THAN MONTHLY

## 2023-07-27 NOTE — PROGRESS NOTES
Chief Complaint   Patient presents with    [facility]    Headache     Began a year and a half ago, and happens about once a month                                                                                                                                        HPI:   Lele presents today with the following.    Problem   Migraine With Aura and Without Status Migrainosus, Not Intractable       Current Outpatient Medications   Medication Sig Dispense Refill    ibuprofen (MOTRIN) 600 MG Tab Take 1 Tablet by mouth every 6 hours as needed for Inflammation. 30 Tablet 0    vitamin D, Ergocalciferol, (DRISDOL) 1.25 MG (39154 UT) Cap capsule Take 1 capsule by mouth every 7 days. 12 capsule 0     No current facility-administered medications for this visit.       Allergies as of 07/27/2023 - Reviewed 07/27/2023   Allergen Reaction Noted    Sulfa drugs  01/05/2021        ROS:  All systems negative expect as addressed in assessment and plan.     BP 98/60 (BP Location: Right arm, Patient Position: Sitting, BP Cuff Size: Adult)   Pulse 83   Temp 36.5 °C (97.7 °F) (Temporal)   Ht 1.829 m (6')   Wt 86.7 kg (191 lb 1.6 oz)   SpO2 99%   BMI 25.92 kg/m²       Physical Exam  Vitals reviewed.   Constitutional:       Appearance: Normal appearance.   HENT:      Head: Normocephalic and atraumatic.      Mouth/Throat:      Mouth: Mucous membranes are moist.   Eyes:      Extraocular Movements: Extraocular movements intact.      Conjunctiva/sclera: Conjunctivae normal.   Pulmonary:      Effort: Pulmonary effort is normal.   Musculoskeletal:         General: Normal range of motion.      Cervical back: Normal range of motion.   Skin:     General: Skin is warm and dry.   Neurological:      General: No focal deficit present.      Mental Status: He is alert and oriented to person, place, and time.   Psychiatric:         Mood and Affect: Mood normal.         Behavior: Behavior normal.         Thought Content: Thought content normal.            Assessment and Plan:  33 y.o. male with the following issues.    1. Migraine with aura and without status migrainosus, not intractable  Comp Metabolic Panel    Lipid Profile    FREE THYROXINE    TSH    VITAMIN D,25 HYDROXY (DEFICIENCY)    HEMOGLOBIN A1C    TRIIDOTHYRONINE    IRON/TOTAL IRON BIND    VITAMIN B12    FOLATE    FERRITIN    CBC WITHOUT DIFFERENTIAL      2. Screening for thyroid disorder  FREE THYROXINE    TSH      3. Encounter for screening for lipid disorder  Lipid Profile      4. Encounter for screening for diabetes mellitus  Comp Metabolic Panel    HEMOGLOBIN A1C      5. Need for vaccination        6. Vitamin D deficiency  VITAMIN D,25 HYDROXY (DEFICIENCY)      7. Encounter for vitamin deficiency screening  VITAMIN D,25 HYDROXY (DEFICIENCY)    IRON/TOTAL IRON BIND    VITAMIN B12    FOLATE    FERRITIN      8. Immunity status testing  HEP B SURFACE AB    HEP B SURFACE ANTIGEN    HEP B CORE AB TOTAL           Migraine with aura and without status migrainosus, not intractable  Chronic unstable. Patient reports that he has been having migraines for the last 1-2 years. He gets spots in his vision. He has sensitivity to light and sound. He denies any nausea, vomiting. His pain is primarily behind his eyes. He uses ibuprofen to try to help. He does smoke marijuana which he reports helps with his migraines.     Discussed causes and prevention.     Patient advised to alternate ibuprofen and excedrin. Patient advised supplements to help with migraine prevention. Will obtain labs to rule out additional causes of headaches.       Return in about 1 month (around 8/27/2023) for follow up for migraines.      Please note that this dictation was created using voice recognition software. I have worked with consultants from the vendor as well as technical experts from [employer] [employer] to optimize the interface. I have made every reasonable attempt to correct obvious errors, but I expect that there are errors of  grammar and possibly content that I did not discover before finalizing the note.

## 2023-07-27 NOTE — ASSESSMENT & PLAN NOTE
Chronic unstable. Patient reports that he has been having migraines for the last 1-2 years. He gets spots in his vision. He has sensitivity to light and sound. He denies any nausea, vomiting. His pain is primarily behind his eyes. He uses ibuprofen to try to help. He does smoke marijuana which he reports helps with his migraines.     Discussed causes and prevention.     Patient advised to alternate ibuprofen and excedrin. Patient advised supplements to help with migraine prevention. Will obtain labs to rule out additional causes of headaches.

## 2023-08-23 ENCOUNTER — HOSPITAL ENCOUNTER (OUTPATIENT)
Dept: LAB | Facility: MEDICAL CENTER | Age: 33
End: 2023-08-23
Attending: NURSE PRACTITIONER
Payer: COMMERCIAL

## 2023-08-23 DIAGNOSIS — E55.9 VITAMIN D DEFICIENCY: ICD-10-CM

## 2023-08-23 DIAGNOSIS — Z13.220 ENCOUNTER FOR SCREENING FOR LIPID DISORDER: ICD-10-CM

## 2023-08-23 DIAGNOSIS — G43.109 MIGRAINE WITH AURA AND WITHOUT STATUS MIGRAINOSUS, NOT INTRACTABLE: ICD-10-CM

## 2023-08-23 DIAGNOSIS — Z13.1 ENCOUNTER FOR SCREENING FOR DIABETES MELLITUS: ICD-10-CM

## 2023-08-23 DIAGNOSIS — Z13.21 ENCOUNTER FOR VITAMIN DEFICIENCY SCREENING: ICD-10-CM

## 2023-08-23 DIAGNOSIS — Z13.29 SCREENING FOR THYROID DISORDER: ICD-10-CM

## 2023-08-23 DIAGNOSIS — Z01.84 IMMUNITY STATUS TESTING: ICD-10-CM

## 2023-08-23 LAB
25(OH)D3 SERPL-MCNC: 24 NG/ML (ref 30–100)
ALBUMIN SERPL BCP-MCNC: 4.8 G/DL (ref 3.2–4.9)
ALBUMIN/GLOB SERPL: 1.7 G/DL
ALP SERPL-CCNC: 74 U/L (ref 30–99)
ALT SERPL-CCNC: 29 U/L (ref 2–50)
ANION GAP SERPL CALC-SCNC: 13 MMOL/L (ref 7–16)
AST SERPL-CCNC: 23 U/L (ref 12–45)
BILIRUB SERPL-MCNC: 0.4 MG/DL (ref 0.1–1.5)
BUN SERPL-MCNC: 17 MG/DL (ref 8–22)
CALCIUM ALBUM COR SERPL-MCNC: 9 MG/DL (ref 8.5–10.5)
CALCIUM SERPL-MCNC: 9.6 MG/DL (ref 8.5–10.5)
CHLORIDE SERPL-SCNC: 104 MMOL/L (ref 96–112)
CHOLEST SERPL-MCNC: 133 MG/DL (ref 100–199)
CO2 SERPL-SCNC: 23 MMOL/L (ref 20–33)
CREAT SERPL-MCNC: 0.97 MG/DL (ref 0.5–1.4)
ERYTHROCYTE [DISTWIDTH] IN BLOOD BY AUTOMATED COUNT: 40.5 FL (ref 35.9–50)
EST. AVERAGE GLUCOSE BLD GHB EST-MCNC: 111 MG/DL
FASTING STATUS PATIENT QL REPORTED: NORMAL
FERRITIN SERPL-MCNC: 141 NG/ML (ref 22–322)
FOLATE SERPL-MCNC: 28.7 NG/ML
GFR SERPLBLD CREATININE-BSD FMLA CKD-EPI: 105 ML/MIN/1.73 M 2
GLOBULIN SER CALC-MCNC: 2.9 G/DL (ref 1.9–3.5)
GLUCOSE SERPL-MCNC: 102 MG/DL (ref 65–99)
HBA1C MFR BLD: 5.5 % (ref 4–5.6)
HBV CORE AB SERPL QL IA: NONREACTIVE
HBV SURFACE AB SERPL IA-ACNC: 3.65 MIU/ML (ref 0–10)
HBV SURFACE AG SER QL: NORMAL
HCT VFR BLD AUTO: 46.6 % (ref 42–52)
HDLC SERPL-MCNC: 24 MG/DL
HGB BLD-MCNC: 15.7 G/DL (ref 14–18)
IRON SATN MFR SERPL: 22 % (ref 15–55)
IRON SERPL-MCNC: 66 UG/DL (ref 50–180)
LDLC SERPL CALC-MCNC: 90 MG/DL
MCH RBC QN AUTO: 29.5 PG (ref 27–33)
MCHC RBC AUTO-ENTMCNC: 33.7 G/DL (ref 32.3–36.5)
MCV RBC AUTO: 87.4 FL (ref 81.4–97.8)
PLATELET # BLD AUTO: 330 K/UL (ref 164–446)
PMV BLD AUTO: 9.6 FL (ref 9–12.9)
POTASSIUM SERPL-SCNC: 4.4 MMOL/L (ref 3.6–5.5)
PROT SERPL-MCNC: 7.7 G/DL (ref 6–8.2)
RBC # BLD AUTO: 5.33 M/UL (ref 4.7–6.1)
SODIUM SERPL-SCNC: 140 MMOL/L (ref 135–145)
T3 SERPL-MCNC: 157 NG/DL (ref 60–181)
T4 FREE SERPL-MCNC: 1.24 NG/DL (ref 0.93–1.7)
TIBC SERPL-MCNC: 304 UG/DL (ref 250–450)
TRIGL SERPL-MCNC: 96 MG/DL (ref 0–149)
TSH SERPL DL<=0.005 MIU/L-ACNC: 1.28 UIU/ML (ref 0.38–5.33)
UIBC SERPL-MCNC: 238 UG/DL (ref 110–370)
VIT B12 SERPL-MCNC: 431 PG/ML (ref 211–911)
WBC # BLD AUTO: 7.1 K/UL (ref 4.8–10.8)

## 2023-08-23 PROCEDURE — 80061 LIPID PANEL: CPT

## 2023-08-23 PROCEDURE — 84480 ASSAY TRIIODOTHYRONINE (T3): CPT

## 2023-08-23 PROCEDURE — 86706 HEP B SURFACE ANTIBODY: CPT

## 2023-08-23 PROCEDURE — 82607 VITAMIN B-12: CPT

## 2023-08-23 PROCEDURE — 83550 IRON BINDING TEST: CPT

## 2023-08-23 PROCEDURE — 80053 COMPREHEN METABOLIC PANEL: CPT

## 2023-08-23 PROCEDURE — 84439 ASSAY OF FREE THYROXINE: CPT

## 2023-08-23 PROCEDURE — 82728 ASSAY OF FERRITIN: CPT

## 2023-08-23 PROCEDURE — 36415 COLL VENOUS BLD VENIPUNCTURE: CPT

## 2023-08-23 PROCEDURE — 82306 VITAMIN D 25 HYDROXY: CPT

## 2023-08-23 PROCEDURE — 82746 ASSAY OF FOLIC ACID SERUM: CPT

## 2023-08-23 PROCEDURE — 87340 HEPATITIS B SURFACE AG IA: CPT

## 2023-08-23 PROCEDURE — 84443 ASSAY THYROID STIM HORMONE: CPT

## 2023-08-23 PROCEDURE — 83540 ASSAY OF IRON: CPT

## 2023-08-23 PROCEDURE — 86704 HEP B CORE ANTIBODY TOTAL: CPT

## 2023-08-23 PROCEDURE — 85027 COMPLETE CBC AUTOMATED: CPT

## 2023-08-23 PROCEDURE — 83036 HEMOGLOBIN GLYCOSYLATED A1C: CPT

## 2023-09-22 ENCOUNTER — OFFICE VISIT (OUTPATIENT)
Dept: MEDICAL GROUP | Facility: PHYSICIAN GROUP | Age: 33
End: 2023-09-22
Payer: COMMERCIAL

## 2023-09-22 VITALS
HEART RATE: 72 BPM | OXYGEN SATURATION: 97 % | DIASTOLIC BLOOD PRESSURE: 74 MMHG | BODY MASS INDEX: 25.95 KG/M2 | SYSTOLIC BLOOD PRESSURE: 110 MMHG | RESPIRATION RATE: 16 BRPM | HEIGHT: 72 IN | TEMPERATURE: 97 F | WEIGHT: 191.6 LBS

## 2023-09-22 DIAGNOSIS — H65.91 MIDDLE EAR EFFUSION, RIGHT: ICD-10-CM

## 2023-09-22 DIAGNOSIS — H92.01 RIGHT EAR PAIN: ICD-10-CM

## 2023-09-22 PROCEDURE — 99213 OFFICE O/P EST LOW 20 MIN: CPT | Performed by: NURSE PRACTITIONER

## 2023-09-22 PROCEDURE — 3078F DIAST BP <80 MM HG: CPT | Performed by: NURSE PRACTITIONER

## 2023-09-22 PROCEDURE — 3074F SYST BP LT 130 MM HG: CPT | Performed by: NURSE PRACTITIONER

## 2023-09-22 RX ORDER — PREDNISONE 20 MG/1
40 TABLET ORAL DAILY
Qty: 6 TABLET | Refills: 0 | Status: SHIPPED | OUTPATIENT
Start: 2023-09-22 | End: 2023-09-25

## 2023-09-22 RX ORDER — PSEUDOEPHEDRINE HCL 120 MG/1
120 TABLET, FILM COATED, EXTENDED RELEASE ORAL 2 TIMES DAILY
Qty: 6 TABLET | Refills: 0 | Status: SHIPPED | OUTPATIENT
Start: 2023-09-22

## 2023-09-22 ASSESSMENT — FIBROSIS 4 INDEX: FIB4 SCORE: 0.44

## 2023-09-22 NOTE — PROGRESS NOTES
Chief Complaint   Patient presents with    Ear Fullness     Right mild pain x 4-5 days                                                                                                                                       HPI:   Lele presents today with the following.    Problem   Right Ear Pain       Current Outpatient Medications   Medication Sig Dispense Refill    predniSONE (DELTASONE) 20 MG Tab Take 2 Tablets by mouth every day for 3 days. 6 Tablet 0    pseudoephedrine SR (SUDAFED 12 HOUR) 120 MG TABLET SR 12 HR Take 1 Tablet by mouth 2 times a day. 6 Tablet 0    ibuprofen (MOTRIN) 600 MG Tab Take 1 Tablet by mouth every 6 hours as needed for Inflammation. 30 Tablet 0     No current facility-administered medications for this visit.       Allergies as of 09/22/2023 - Reviewed 09/22/2023   Allergen Reaction Noted    Sulfa drugs  01/05/2021        ROS:  All systems negative expect as addressed in assessment and plan.     /74 (BP Location: Right arm, Patient Position: Sitting, BP Cuff Size: Adult)   Pulse 72   Temp 36.1 °C (97 °F) (Temporal)   Resp 16   Ht 1.829 m (6')   Wt 86.9 kg (191 lb 9.6 oz)   SpO2 97%   BMI 25.99 kg/m²       Physical Exam  Vitals reviewed.   Constitutional:       Appearance: Normal appearance.   HENT:      Head: Normocephalic and atraumatic.      Right Ear: A middle ear effusion is present. Tympanic membrane is injected and bulging.      Left Ear: A middle ear effusion is present.      Mouth/Throat:      Mouth: Mucous membranes are moist.   Eyes:      Extraocular Movements: Extraocular movements intact.      Conjunctiva/sclera: Conjunctivae normal.   Pulmonary:      Effort: Pulmonary effort is normal.   Musculoskeletal:         General: Normal range of motion.      Cervical back: Normal range of motion.   Skin:     General: Skin is warm and dry.   Neurological:      General: No focal deficit present.      Mental Status: He is alert and oriented to person, place, and time.    Psychiatric:         Mood and Affect: Mood normal.         Behavior: Behavior normal.         Thought Content: Thought content normal.         Assessment and Plan:  33 y.o. male with the following issues.    1. Right ear pain  predniSONE (DELTASONE) 20 MG Tab    pseudoephedrine SR (SUDAFED 12 HOUR) 120 MG TABLET SR 12 HR      2. Middle ear effusion, right  predniSONE (DELTASONE) 20 MG Tab    pseudoephedrine SR (SUDAFED 12 HOUR) 120 MG TABLET SR 12 HR           Right ear pain  Patient reports right ear pain for the last 5 days. He states he has been having popping and pressure. Denies drainage or fever. He denies any recent illness. He reports that he woke up with a sore throat.     Will treat with prednisone for 3 days and sudafed for 3 days. Patient to use flonase or triamcinolone nasal spray.         Return if symptoms worsen or fail to improve.      Please note that this dictation was created using voice recognition software. I have worked with consultants from the vendor as well as technical experts from [facility] to optimize the interface. I have made every reasonable attempt to correct obvious errors, but I expect that there are errors of grammar and possibly content that I did not discover before finalizing the note.

## 2023-09-22 NOTE — ASSESSMENT & PLAN NOTE
Patient reports right ear pain for the last 5 days. He states he has been having popping and pressure. Denies drainage or fever. He denies any recent illness. He reports that he woke up with a sore throat.     Will treat with prednisone for 3 days and sudafed for 3 days. Patient to use flonase or triamcinolone nasal spray.

## 2023-09-26 ENCOUNTER — OFFICE VISIT (OUTPATIENT)
Dept: URGENT CARE | Facility: PHYSICIAN GROUP | Age: 33
End: 2023-09-26
Payer: COMMERCIAL

## 2023-09-26 VITALS
OXYGEN SATURATION: 95 % | RESPIRATION RATE: 16 BRPM | WEIGHT: 191 LBS | BODY MASS INDEX: 25.87 KG/M2 | TEMPERATURE: 97.8 F | SYSTOLIC BLOOD PRESSURE: 132 MMHG | DIASTOLIC BLOOD PRESSURE: 84 MMHG | HEART RATE: 85 BPM | HEIGHT: 72 IN

## 2023-09-26 DIAGNOSIS — H65.191 ACUTE MEE (MIDDLE EAR EFFUSION), RIGHT: ICD-10-CM

## 2023-09-26 PROCEDURE — 3075F SYST BP GE 130 - 139MM HG: CPT | Performed by: FAMILY MEDICINE

## 2023-09-26 PROCEDURE — 3079F DIAST BP 80-89 MM HG: CPT | Performed by: FAMILY MEDICINE

## 2023-09-26 PROCEDURE — 99213 OFFICE O/P EST LOW 20 MIN: CPT | Performed by: FAMILY MEDICINE

## 2023-09-26 RX ORDER — CETIRIZINE HYDROCHLORIDE 10 MG/1
10 TABLET ORAL DAILY
Qty: 10 TABLET | Refills: 0 | Status: SHIPPED
Start: 2023-09-26 | End: 2023-10-03

## 2023-09-26 RX ORDER — PREDNISONE 20 MG/1
40 TABLET ORAL DAILY
Qty: 10 TABLET | Refills: 0 | Status: SHIPPED | OUTPATIENT
Start: 2023-09-26 | End: 2023-10-01

## 2023-09-26 ASSESSMENT — ENCOUNTER SYMPTOMS: FEVER: 0

## 2023-09-26 ASSESSMENT — FIBROSIS 4 INDEX: FIB4 SCORE: 0.44

## 2023-09-26 NOTE — PROGRESS NOTES
Subjective:     Lele Salcedo is a 33 y.o. male who presents for Earache (R ear pain, tender, onset today )    HPI  Pt presents for repeat evaluation of right ear  Patient was seen by PCP 4 days ago for right ear pain and diagnosed with middle ear effusion  Patient was treated with Sudafed and prednisone  Since that visit, patient has tested positive for COVID-19  Having increased pain in the right ear   No drainage/discharge   No hearing difficulties     Review of Systems   Constitutional:  Negative for fever.   HENT:  Positive for ear pain. Negative for ear discharge and hearing loss.    Skin:  Negative for rash.       PMH:  has a past medical history of Allergy, Depression, Kidney stones, and Migraine.  MEDS:   Current Outpatient Medications:     cetirizine (ZYRTEC ALLERGY) 10 MG Tab, Take 1 Tablet by mouth every day for 10 days., Disp: 10 Tablet, Rfl: 0    predniSONE (DELTASONE) 20 MG Tab, Take 2 Tablets by mouth every day for 5 days., Disp: 10 Tablet, Rfl: 0    pseudoephedrine SR (SUDAFED 12 HOUR) 120 MG TABLET SR 12 HR, Take 1 Tablet by mouth 2 times a day., Disp: 6 Tablet, Rfl: 0    ibuprofen (MOTRIN) 600 MG Tab, Take 1 Tablet by mouth every 6 hours as needed for Inflammation., Disp: 30 Tablet, Rfl: 0  ALLERGIES:   Allergies   Allergen Reactions    Sulfa Drugs      SURGHX:   Past Surgical History:   Procedure Laterality Date    OPEN REDUCTION      TONSILLECTOMY AND ADENOIDECTOMY      VASECTOMY      WRIST ARTHROSCOPY Right      SOCHX:  reports that he has never smoked. He has never used smokeless tobacco. He reports current alcohol use of about 3.0 - 3.6 oz of alcohol per week. He reports current drug use. Frequency: 7.00 times per week. Drug: Marijuana.     Objective:   /84 (BP Location: Right arm, Patient Position: Sitting, BP Cuff Size: Adult)   Pulse 85   Temp 36.6 °C (97.8 °F) (Temporal)   Resp 16   Ht 1.829 m (6')   Wt 86.6 kg (191 lb)   SpO2 95%   BMI 25.90 kg/m²     Physical  Exam  Constitutional:       General: He is not in acute distress.     Appearance: He is well-developed. He is not diaphoretic.   HENT:      Head: Normocephalic and atraumatic.      Right Ear: Ear canal and external ear normal.      Left Ear: Tympanic membrane, ear canal and external ear normal.      Ears:      Comments: Right tympanic membrane nonerythematous with moderate clear effusion.  No purulence, no erythema, no perforation     Nose: Nose normal.      Mouth/Throat:      Mouth: Mucous membranes are moist.      Pharynx: Oropharynx is clear. No oropharyngeal exudate or posterior oropharyngeal erythema.   Pulmonary:      Effort: Pulmonary effort is normal.   Musculoskeletal:      Cervical back: Normal range of motion and neck supple. No tenderness.   Lymphadenopathy:      Cervical: No cervical adenopathy.   Neurological:      Mental Status: He is alert.       Assessment/Plan:   Assessment    1. Acute RIZWANA (middle ear effusion), right  - cetirizine (ZYRTEC ALLERGY) 10 MG Tab; Take 1 Tablet by mouth every day for 10 days.  Dispense: 10 Tablet; Refill: 0  - predniSONE (DELTASONE) 20 MG Tab; Take 2 Tablets by mouth every day for 5 days.  Dispense: 10 Tablet; Refill: 0    Patient with acute middle ear effusion.  Was improving greatly with prednisone, but then recurred when medication was stopped.  Will restart prednisone for slightly longer course and also recommended using Zyrtec.  Patient will follow-up via telephone next week if symptoms are not resolving.

## 2023-10-01 ENCOUNTER — PATIENT MESSAGE (OUTPATIENT)
Dept: MEDICAL GROUP | Facility: PHYSICIAN GROUP | Age: 33
End: 2023-10-01
Payer: COMMERCIAL

## 2023-10-01 DIAGNOSIS — H92.01 RIGHT EAR PAIN: ICD-10-CM

## 2023-10-01 DIAGNOSIS — H65.91 MIDDLE EAR EFFUSION, RIGHT: ICD-10-CM

## 2023-10-03 ENCOUNTER — OFFICE VISIT (OUTPATIENT)
Dept: URGENT CARE | Facility: PHYSICIAN GROUP | Age: 33
End: 2023-10-03
Payer: COMMERCIAL

## 2023-10-03 ENCOUNTER — PATIENT MESSAGE (OUTPATIENT)
Dept: MEDICAL GROUP | Facility: PHYSICIAN GROUP | Age: 33
End: 2023-10-03

## 2023-10-03 VITALS
OXYGEN SATURATION: 98 % | TEMPERATURE: 97.2 F | HEART RATE: 68 BPM | SYSTOLIC BLOOD PRESSURE: 96 MMHG | DIASTOLIC BLOOD PRESSURE: 62 MMHG | RESPIRATION RATE: 20 BRPM | HEIGHT: 72 IN | WEIGHT: 191 LBS | BODY MASS INDEX: 25.87 KG/M2

## 2023-10-03 DIAGNOSIS — H65.02 ACUTE SEROUS OTITIS MEDIA OF LEFT EAR, RECURRENCE NOT SPECIFIED: ICD-10-CM

## 2023-10-03 DIAGNOSIS — H66.001 NON-RECURRENT ACUTE SUPPURATIVE OTITIS MEDIA OF RIGHT EAR WITHOUT SPONTANEOUS RUPTURE OF TYMPANIC MEMBRANE: ICD-10-CM

## 2023-10-03 PROCEDURE — 99214 OFFICE O/P EST MOD 30 MIN: CPT | Performed by: NURSE PRACTITIONER

## 2023-10-03 PROCEDURE — 3074F SYST BP LT 130 MM HG: CPT | Performed by: NURSE PRACTITIONER

## 2023-10-03 PROCEDURE — 3078F DIAST BP <80 MM HG: CPT | Performed by: NURSE PRACTITIONER

## 2023-10-03 RX ORDER — FLUTICASONE PROPIONATE 50 MCG
2 SPRAY, SUSPENSION (ML) NASAL DAILY
Qty: 16 G | Refills: 0 | Status: SHIPPED | OUTPATIENT
Start: 2023-10-03 | End: 2023-10-17

## 2023-10-03 RX ORDER — AMOXICILLIN AND CLAVULANATE POTASSIUM 875; 125 MG/1; MG/1
1 TABLET, FILM COATED ORAL 2 TIMES DAILY
Qty: 20 TABLET | Refills: 0 | Status: SHIPPED | OUTPATIENT
Start: 2023-10-03 | End: 2023-10-03

## 2023-10-03 RX ORDER — CETIRIZINE HYDROCHLORIDE 10 MG/1
10 TABLET ORAL DAILY
Qty: 30 TABLET | Refills: 0 | Status: SHIPPED | OUTPATIENT
Start: 2023-10-03 | End: 2023-11-12

## 2023-10-03 RX ORDER — AMOXICILLIN AND CLAVULANATE POTASSIUM 875; 125 MG/1; MG/1
1 TABLET, FILM COATED ORAL 2 TIMES DAILY
Qty: 20 TABLET | Refills: 0 | Status: SHIPPED | OUTPATIENT
Start: 2023-10-03 | End: 2023-10-13

## 2023-10-03 ASSESSMENT — FIBROSIS 4 INDEX: FIB4 SCORE: 0.44

## 2023-10-03 NOTE — PROGRESS NOTES
Lele Salcedo is a 33 y.o. male who presents for Ear Pain (Right; )      HPI  This is a new problem. Lele Sacledo is a 33 y.o. patient who presents to urgent care with c/o: Ear pain right. Seen before for acute otitis media on September 22 and then again on September 26.  He he was given prednisone which helped a little bit.  He then developed COVID which she is still recovering from.  His ear started hurting worse after he got COVID.  He is currently taking Zyrtec and Sudafed.  He occasionally uses Afrin spray.  Nothing is helping his ear pain.  He cannot get an appointment with the ear nose and throat specialist until January.  No other aggravating leaving factors.          ROS See HPI    Allergies:       Allergies   Allergen Reactions    Sulfa Drugs        PMSFS Hx:  Past Medical History:   Diagnosis Date    Allergy     Depression     Kidney stones     Migraine      Past Surgical History:   Procedure Laterality Date    OPEN REDUCTION      TONSILLECTOMY AND ADENOIDECTOMY      VASECTOMY      WRIST ARTHROSCOPY Right      Family History   Problem Relation Age of Onset    Alcohol abuse Father         everyone drinks on his side    Diabetes Maternal Grandfather     Cancer Neg Hx     Heart Disease Neg Hx      Social History     Tobacco Use    Smoking status: Never    Smokeless tobacco: Never    Tobacco comments:     I don't smoke tobacco   Substance Use Topics    Alcohol use: Yes     Alcohol/week: 3.0 - 3.6 oz     Types: 5 - 6 Cans of beer per week     Comment: I dont drink every week       Problems:   Patient Active Problem List   Diagnosis    Chronic fatigue    EDI (generalized anxiety disorder)    Sleep disturbance    Insomnia    Migraine with aura and without status migrainosus, not intractable    Right ear pain       Medications:   Current Outpatient Medications on File Prior to Visit   Medication Sig Dispense Refill    cetirizine (ZYRTEC ALLERGY) 10 MG Tab Take 1 Tablet by mouth every day for 10  days. 10 Tablet 0    pseudoephedrine SR (SUDAFED 12 HOUR) 120 MG TABLET SR 12 HR Take 1 Tablet by mouth 2 times a day. 6 Tablet 0    ibuprofen (MOTRIN) 600 MG Tab Take 1 Tablet by mouth every 6 hours as needed for Inflammation. 30 Tablet 0     No current facility-administered medications on file prior to visit.          Objective:     BP 96/62 (BP Location: Right arm, Patient Position: Sitting, BP Cuff Size: Adult)   Pulse 68   Temp 36.2 °C (97.2 °F) (Temporal)   Resp 20   Ht 1.829 m (6')   Wt 86.6 kg (191 lb)   SpO2 98%   BMI 25.90 kg/m²     Physical Exam  Vitals and nursing note reviewed.   Constitutional:       General: He is not in acute distress.     Appearance: He is well-developed.   HENT:      Head: Normocephalic.      Right Ear: Hearing, ear canal and external ear normal. A middle ear effusion (purulent) is present. Tympanic membrane is erythematous and bulging.      Left Ear: Hearing, ear canal and external ear normal. A middle ear effusion (serous) is present.      Mouth/Throat:      Lips: Pink.   Cardiovascular:      Rate and Rhythm: Normal rate.      Pulses: Normal pulses.   Pulmonary:      Effort: Pulmonary effort is normal.   Musculoskeletal:      Cervical back: Normal range of motion and neck supple.   Lymphadenopathy:      Head:      Right side of head: No tonsillar adenopathy.      Left side of head: No tonsillar adenopathy.      Cervical: Cervical adenopathy present.   Skin:     General: Skin is warm and dry.      Capillary Refill: Capillary refill takes less than 2 seconds.   Neurological:      Mental Status: He is alert and oriented to person, place, and time.   Psychiatric:         Mood and Affect: Mood normal.         Speech: Speech normal.         Behavior: Behavior normal. Behavior is cooperative.         Thought Content: Thought content normal.           Assessment /Associated Orders:      1. Non-recurrent acute suppurative otitis media of right ear without spontaneous rupture of  tympanic membrane        2. Acute serous otitis media of left ear, recurrence not specified                Medical Decision Making:      Pt is clinically stable at today's acute urgent care visit.  No acute distress noted.  VSS. Appropriate for outpatient care at this time.   Acute problem today with uncertain prognosis.   Augmentin  RX  Flonase RX  Zyrtec RX     OTC antihistamine of choice. Follow manufactures dosing and safety guidelines.   OTC decongestant of choice. Follow manufacturers guidelines for dosing and safety precautions.    Cool mist humidifier at night prn   Warm compress prn pain   OTC  analgesic of choice (acetaminophen or NSAID) prn pain. Follow manufactures dosing and safety precautions.   Educated in proper administration of  prescription medication(s) ordered today including safety, possible SE, risks, benefits, rationale and alternatives to therapy.   FU ENT as scheduled.   Discussed Dx, management options (risks,benefits, and alternatives to planned treatment), natural progression and supportive care.  Expressed understanding and the treatment plan was agreed upon.   Questions were encouraged and answered   Return to urgent care prn if new or worsening sx or if there is no improvement in condition prn.    Educated in Red flags and indications to immediately call 911 or present to the Emergency Department.       Time I spent evaluating Lele Salcedo in urgent care today was 31  minutes. This time includes preparing for visit, reviewing any pertinent notes or test results, counseling/education, exam, obtaining HPI, interpretation of lab tests, medication management and documentation as indicated above.Time does not include separately billable procedures noted .       Please note that this dictation was created using voice recognition software. I have worked with consultants from the vendor as well as technical experts from [employer] to optimize the interface. I have made every  reasonable attempt to correct obvious errors, but I expect that there are errors of grammar and possibly content that I did not discover before finalizing the note.  This note was electronically signed by provider

## 2023-11-12 ENCOUNTER — OFFICE VISIT (OUTPATIENT)
Dept: URGENT CARE | Facility: PHYSICIAN GROUP | Age: 33
End: 2023-11-12
Payer: COMMERCIAL

## 2023-11-12 VITALS
DIASTOLIC BLOOD PRESSURE: 70 MMHG | HEIGHT: 72 IN | TEMPERATURE: 98.8 F | OXYGEN SATURATION: 96 % | HEART RATE: 76 BPM | SYSTOLIC BLOOD PRESSURE: 108 MMHG | RESPIRATION RATE: 20 BRPM | BODY MASS INDEX: 25.87 KG/M2 | WEIGHT: 191 LBS

## 2023-11-12 DIAGNOSIS — H69.93 DYSFUNCTION OF BOTH EUSTACHIAN TUBES: ICD-10-CM

## 2023-11-12 DIAGNOSIS — H66.004 RECURRENT ACUTE SUPPURATIVE OTITIS MEDIA OF RIGHT EAR WITHOUT SPONTANEOUS RUPTURE OF TYMPANIC MEMBRANE: ICD-10-CM

## 2023-11-12 PROCEDURE — 99213 OFFICE O/P EST LOW 20 MIN: CPT | Performed by: PHYSICIAN ASSISTANT

## 2023-11-12 PROCEDURE — 3078F DIAST BP <80 MM HG: CPT | Performed by: PHYSICIAN ASSISTANT

## 2023-11-12 PROCEDURE — 3074F SYST BP LT 130 MM HG: CPT | Performed by: PHYSICIAN ASSISTANT

## 2023-11-12 RX ORDER — CETIRIZINE HYDROCHLORIDE 10 MG/1
10 TABLET ORAL DAILY
Qty: 30 TABLET | Refills: 0 | Status: SHIPPED | OUTPATIENT
Start: 2023-11-12

## 2023-11-12 RX ORDER — AMOXICILLIN AND CLAVULANATE POTASSIUM 875; 125 MG/1; MG/1
1 TABLET, FILM COATED ORAL 2 TIMES DAILY
Qty: 14 TABLET | Refills: 0 | Status: SHIPPED | OUTPATIENT
Start: 2023-11-12 | End: 2023-11-19

## 2023-11-12 ASSESSMENT — FIBROSIS 4 INDEX: FIB4 SCORE: 0.44

## 2023-11-12 ASSESSMENT — ENCOUNTER SYMPTOMS
COUGH: 0
CHILLS: 0
FEVER: 0

## 2023-11-12 NOTE — PROGRESS NOTES
Subjective:   Lele Salcedo is a 33 y.o. male who presents today with   Chief Complaint   Patient presents with    Earache     R ear ache     Otalgia   This is a new problem. The current episode started 1 to 4 weeks ago. The problem occurs constantly. The problem has been unchanged. There has been no fever. The pain is moderate. Pertinent negatives include no coughing or ear discharge. He has tried nothing for the symptoms. The treatment provided no relief.     Patient has taken several courses of steroids, 1 round of antibiotics and has also used cetirizine and nasal sprays.  Each have provided some minimal relief.  He states he recently ran out of the Zyrtec and symptoms seem to come back.    PMH:  has a past medical history of Allergy, Depression, Kidney stones, and Migraine.  MEDS:   Current Outpatient Medications:     cetirizine (ZYRTEC) 10 MG Tab, Take 1 Tablet by mouth every day., Disp: 30 Tablet, Rfl: 0    amoxicillin-clavulanate (AUGMENTIN) 875-125 MG Tab, Take 1 Tablet by mouth 2 times a day for 7 days., Disp: 14 Tablet, Rfl: 0    pseudoephedrine SR (SUDAFED 12 HOUR) 120 MG TABLET SR 12 HR, Take 1 Tablet by mouth 2 times a day., Disp: 6 Tablet, Rfl: 0    ibuprofen (MOTRIN) 600 MG Tab, Take 1 Tablet by mouth every 6 hours as needed for Inflammation., Disp: 30 Tablet, Rfl: 0  ALLERGIES:   Allergies   Allergen Reactions    Sulfa Drugs      SURGHX:   Past Surgical History:   Procedure Laterality Date    OPEN REDUCTION      TONSILLECTOMY AND ADENOIDECTOMY      VASECTOMY      WRIST ARTHROSCOPY Right      SOCHX:  reports that he has never smoked. He has never used smokeless tobacco. He reports current alcohol use of about 3.0 - 3.6 oz of alcohol per week. He reports current drug use. Frequency: 7.00 times per week. Drug: Marijuana.  FH: Reviewed with patient, not pertinent to this visit.     Review of Systems   Constitutional:  Negative for chills and fever.   HENT:  Positive for ear pain. Negative for  ear discharge.    Respiratory:  Negative for cough.       Objective:   /70 (BP Location: Right arm, Patient Position: Sitting, BP Cuff Size: Adult)   Pulse 76   Temp 37.1 °C (98.8 °F) (Temporal)   Resp 20   Ht 1.829 m (6')   Wt 86.6 kg (191 lb)   SpO2 96%   BMI 25.90 kg/m²   Physical Exam  Vitals and nursing note reviewed.   Constitutional:       General: He is not in acute distress.     Appearance: Normal appearance. He is well-developed. He is not ill-appearing or toxic-appearing.   HENT:      Head: Normocephalic and atraumatic.      Right Ear: Hearing and ear canal normal. A middle ear effusion is present. Tympanic membrane is erythematous.      Left Ear: Hearing and ear canal normal. A middle ear effusion is present. Tympanic membrane is not erythematous.   Cardiovascular:      Rate and Rhythm: Normal rate.   Pulmonary:      Effort: Pulmonary effort is normal.   Musculoskeletal:      Comments: Normal movement in all 4 extremities   Skin:     General: Skin is warm and dry.   Neurological:      Mental Status: He is alert.      Coordination: Coordination normal.   Psychiatric:         Mood and Affect: Mood normal.       Assessment/Plan:   Assessment    1. Recurrent acute suppurative otitis media of right ear without spontaneous rupture of tympanic membrane  - cetirizine (ZYRTEC) 10 MG Tab; Take 1 Tablet by mouth every day.  Dispense: 30 Tablet; Refill: 0  - amoxicillin-clavulanate (AUGMENTIN) 875-125 MG Tab; Take 1 Tablet by mouth 2 times a day for 7 days.  Dispense: 14 Tablet; Refill: 0    2. Dysfunction of both eustachian tubes    Recommended use of Zyrtec as well as nasal spray and decongestants.  Patient states the nasal spray and decongestants have not been helpful and he would like to continue with the Zyrtec.  We will also send over an antibiotic to treat for infection.  Follow-up with ENT is scheduled for December 14.    Differential diagnosis, natural history, supportive care, and indications  for immediate follow-up discussed.   Patient given instructions and understanding of medications and treatment.    If not improving in 3-5 days, F/U with PCP or return to UC if symptoms worsen.    Patient agreeable to plan.    Please note that this dictation was created using voice recognition software. I have made every reasonable attempt to correct obvious errors, but I expect that there are errors of grammar and possibly content that I did not discover before finalizing the note.    Syed Royal PA-C

## 2023-11-16 ENCOUNTER — HOSPITAL ENCOUNTER (OUTPATIENT)
Facility: MEDICAL CENTER | Age: 33
End: 2023-11-16
Attending: STUDENT IN AN ORGANIZED HEALTH CARE EDUCATION/TRAINING PROGRAM

## 2023-11-16 PROCEDURE — 87591 N.GONORRHOEAE DNA AMP PROB: CPT

## 2023-11-16 PROCEDURE — 87086 URINE CULTURE/COLONY COUNT: CPT

## 2023-11-16 PROCEDURE — 87491 CHLMYD TRACH DNA AMP PROBE: CPT

## 2023-11-17 LAB
C TRACH DNA SPEC QL NAA+PROBE: NEGATIVE
N GONORRHOEA DNA SPEC QL NAA+PROBE: NEGATIVE
SPECIMEN SOURCE: NORMAL

## 2023-11-18 ENCOUNTER — HOSPITAL ENCOUNTER (EMERGENCY)
Facility: MEDICAL CENTER | Age: 33
End: 2023-11-18
Attending: EMERGENCY MEDICINE

## 2023-11-18 ENCOUNTER — APPOINTMENT (OUTPATIENT)
Dept: RADIOLOGY | Facility: MEDICAL CENTER | Age: 33
End: 2023-11-18
Attending: EMERGENCY MEDICINE

## 2023-11-18 VITALS
DIASTOLIC BLOOD PRESSURE: 77 MMHG | WEIGHT: 192.9 LBS | RESPIRATION RATE: 16 BRPM | OXYGEN SATURATION: 98 % | HEIGHT: 72 IN | BODY MASS INDEX: 26.13 KG/M2 | SYSTOLIC BLOOD PRESSURE: 135 MMHG | HEART RATE: 74 BPM | TEMPERATURE: 98 F

## 2023-11-18 DIAGNOSIS — H92.01 RIGHT EAR PAIN: ICD-10-CM

## 2023-11-18 DIAGNOSIS — Z87.442 PERSONAL HISTORY OF URINARY CALCULI: ICD-10-CM

## 2023-11-18 LAB
BACTERIA UR CULT: NORMAL
SIGNIFICANT IND 70042: NORMAL
SITE SITE: NORMAL
SOURCE SOURCE: NORMAL

## 2023-11-18 PROCEDURE — 99283 EMERGENCY DEPT VISIT LOW MDM: CPT

## 2023-11-18 PROCEDURE — 70480 CT ORBIT/EAR/FOSSA W/O DYE: CPT

## 2023-11-18 RX ORDER — NAPROXEN 500 MG/1
500 TABLET ORAL 2 TIMES DAILY WITH MEALS
Qty: 60 TABLET | Refills: 0 | Status: SHIPPED | OUTPATIENT
Start: 2023-11-18 | End: 2023-12-18

## 2023-11-18 ASSESSMENT — FIBROSIS 4 INDEX: FIB4 SCORE: 0.44

## 2023-11-18 NOTE — ED PROVIDER NOTES
ED Provider Note    CHIEF COMPLAINT  Chief Complaint   Patient presents with    Ear Pain     Patient reports right ear pain x 1 month. Patient states waiting for ENT but appointment is not until next month. Patient seen at urgent care x 5 times and placed on abx. Pain is increasing.        EXTERNAL RECORDS REVIEWED  Outpatient Notes seen in urgent care 11/12/2023 for right earache.  It was noted at that time that his symptoms had been ongoing between 1 and 4 weeks.  He has taken several courses of steroids, 1 round of antibiotics, and has also used cetirizine and nasal sprays.  Each provided some minimal relief but he recently ran out of the Zyrtec and symptoms returned.  He was prescribed Zyrtec and Augmentin.  Follow-up with ENT was scheduled for December 14.    HPI/ROS  LIMITATION TO HISTORY   Select: : None  OUTSIDE HISTORIAN(S):  None    Lele Salcedo is a 33 y.o. male who presents with right ear pain that has been ongoing for the past 2 months.  He has been seen in urgent care 4 or 5 times and has been through multiple courses of Augmentin and 2 courses of steroids as well as nasal and allergy pills without any improvement.  In fact, he notes that the symptoms have worsened.  He denies any fevers or hearing loss.  He has an appointment to be seen at ENT for 12/14 but does not think he can wait because the pain is so significant.    PAST MEDICAL HISTORY   has a past medical history of Allergy, Depression, Kidney stones, and Migraine.    SURGICAL HISTORY   has a past surgical history that includes wrist arthroscopy (Right); open reduction; vasectomy; and tonsillectomy and adenoidectomy.    FAMILY HISTORY  Family History   Problem Relation Age of Onset    Alcohol abuse Father         everyone drinks on his side    Diabetes Maternal Grandfather     Cancer Neg Hx     Heart Disease Neg Hx        SOCIAL HISTORY  Social History     Tobacco Use    Smoking status: Never    Smokeless tobacco: Never    Tobacco  comments:     I don't smoke tobacco   Vaping Use    Vaping Use: Never used   Substance and Sexual Activity    Alcohol use: Yes     Alcohol/week: 3.0 - 3.6 oz     Types: 5 - 6 Cans of beer per week     Comment: I dont drink every week    Drug use: Yes     Frequency: 7.0 times per week     Types: Marijuana     Comment: smoke weed every day    Sexual activity: Yes     Partners: Female     Birth control/protection: Male Sterilization     Comment: vasectomy       CURRENT MEDICATIONS  Home Medications       Reviewed by Zina Donis R.N. (Registered Nurse) on 11/18/23 at 1038  Med List Status: Partial     Medication Last Dose Status   amoxicillin-clavulanate (AUGMENTIN) 875-125 MG Tab  Active   cetirizine (ZYRTEC) 10 MG Tab  Active   ibuprofen (MOTRIN) 600 MG Tab  Active   pseudoephedrine SR (SUDAFED 12 HOUR) 120 MG TABLET SR 12 HR  Active                    ALLERGIES  Allergies   Allergen Reactions    Sulfa Drugs        PHYSICAL EXAM  VITAL SIGNS: /66   Pulse 81   Temp 36.4 °C (97.5 °F) (Oral)   Resp 18   Ht 1.829 m (6')   Wt 87.5 kg (192 lb 14.4 oz)   SpO2 96%   BMI 26.16 kg/m²    Physical Exam  Vitals and nursing note reviewed.   Constitutional:       Appearance: Normal appearance.   HENT:      Head: Normocephalic and atraumatic.      Comments: Tender over the right TMJ without overlying erythema, crepitus, fluctuance.     Right Ear: External ear normal.      Left Ear: External ear normal.      Ears:      Comments: Tenderness with insertion of the speculum in the right external auditory canal.  Tenderness with right pinna manipulation but no obvious erythema or edema in the external auditory canal.  There is no tenderness over the mastoid process bilaterally.  Bilateral tympanic membranes are pearly with good light reflex.  No clear effusion.     Nose: Nose normal.      Mouth/Throat:      Mouth: Mucous membranes are moist.      Pharynx: Oropharynx is clear.      Comments: No trismus.  No obvious dental  disease.  No tenderness to percussion over maxillary mandibular dentition.  No broken teeth.  No gum fluctuance or erythema.  Eyes:      Extraocular Movements: Extraocular movements intact.      Conjunctiva/sclera: Conjunctivae normal.      Pupils: Pupils are equal, round, and reactive to light.   Cardiovascular:      Rate and Rhythm: Normal rate.      Pulses: Normal pulses.   Pulmonary:      Effort: Pulmonary effort is normal.   Musculoskeletal:         General: Normal range of motion.      Cervical back: Normal range of motion and neck supple.   Skin:     General: Skin is warm and dry.   Neurological:      General: No focal deficit present.      Mental Status: He is alert and oriented to person, place, and time.   Psychiatric:         Mood and Affect: Mood normal.         Behavior: Behavior normal.       DIAGNOSTIC STUDIES / PROCEDURES  RADIOLOGY  Radiologist interpretation:   CT-POST-FOSSA-EAR W/O   Final Result      CT of the temporal bones without contrast within normal limits.        COURSE & MEDICAL DECISION MAKING    ED Observation Status? No; Patient does not meet criteria for ED Observation.     INITIAL ASSESSMENT, COURSE AND PLAN  Care Narrative: This is a 33-year-old male who is here with persistent and worsening right ear pain and now he is developing left ear pain despite 2 courses of steroids, several courses of Augmentin, allergy medications, nasal sprays, and eardrops.  Here he is afebrile with normal vital signs, appears well-hydrated and nontoxic.  There is pain with insertion of the speculum into the right external auditory canal and to a lesser extent the left external auditory canal but I can see no obvious canal swelling or erythema.  The tympanic membranes bilaterally are pearly with good light reflex without obvious effusion.  There is no tenderness over the mastoid processes although there is some pain with manipulation of the pinna on the right ear.  There is no swelling, erythema,  fluctuance of the cartilage of the external ear bilaterally.  Given the reassuring exam and persistent symptoms I contacted our ENT, Dr. Alberto, to ask for additional recommendations.  His initial concern was for TMJ syndrome and recommended a CT of the temporal bones which if normal decreased concern for ear pathology.  CT was obtained which thankfully did not reveal any acute abnormality.  Patient was reevaluated at bedside.  He does have some pretty significant tenderness over the bilateral temporal mandibular joints.  There is no overlying erythema or fluctuance.  He has no trismus although there does appear to be an asymmetrical mouth opening which may be the reason why he initially developed pain only on the right side.  We discussed Dr. Alberto's thoughts as well as the results of the CT scan which were reassuring.  Plan will be for oral surgery follow-up as well as NSAIDs.  I placed a referral on his behalf for our on-call oral surgeon.  I also recommended he follow-up both with his dentist and primary care physician.  He was given a prescription for naproxen.  Discharged in good and stable condition with strict return precautions    ADDITIONAL PROBLEM LIST  None  DISPOSITION AND DISCUSSIONS  I have discussed management of the patient with the following physicians and ROBERT's: Dr. Alberto, ENT.    Discussion of management with other [facility] or appropriate source(s): None     Escalation of care considered, and ultimately not performed:blood analysis and acute inpatient care management, however at this time, the patient is most appropriate for outpatient management    Barriers to care at this time, including but not limited to:  None .     Decision tools and prescription drugs considered including, but not limited to:  Naproxen .    FINAL DIAGNOSIS  1. Personal history of urinary calculi    2. Right ear pain      Electronically signed by: Homero Holland M.D., 11/18/2023 10:54 AM

## 2023-11-18 NOTE — ED NOTES
Pt has discharge orders. Pt educated on discharge instructions and new prescriptions.  Pt educated for follow up with maxillofacial surgeon. Contact information provided. Pt verbalizes understanding.   Pt ambulatory to lobby with steady gait. Pt going home with spouse.

## 2023-11-18 NOTE — DISCHARGE INSTRUCTIONS
You were seen in the ER for right ear pain.  Thankfully the CT scan did not reveal any acute abnormality.  I spoke with our on-call ENT, Dr. Alberto, who feels that because of the normal CT scan it is possible that your symptoms are due to TMJ syndrome.  I have given you a referral to oral surgery, I would recommend that you call his office on Monday to schedule an appointment.  You should also follow-up with your primary care physician and dentist this week if possible as well.  I called in a prescription for anti-inflammatories on your behalf, take them as directed.  Return immediately to the ER with new or worsening symptoms.  I hope you feel better soon!

## 2023-11-18 NOTE — ED NOTES
ERP at bedside.   Non-Graft Cartilage Fenestration Text: The cartilage was fenestrated with a 2mm punch biopsy to help facilitate healing.

## 2023-11-18 NOTE — ED TRIAGE NOTES
Chief Complaint   Patient presents with    Ear Pain     Patient reports right ear pain x 1 month. Patient states waiting for ENT but appointment is not until next month. Patient seen at urgent care x 5 times and placed on abx. Pain is increasing.

## 2023-11-18 NOTE — ED NOTES
Rounded on pt. Pt resting in gurney with unlabored breathing. Pt updated on POC.  Pt verbalized understanding.

## 2023-11-29 ENCOUNTER — HOSPITAL ENCOUNTER (OUTPATIENT)
Dept: RADIOLOGY | Facility: MEDICAL CENTER | Age: 33
End: 2023-11-29
Attending: STUDENT IN AN ORGANIZED HEALTH CARE EDUCATION/TRAINING PROGRAM

## 2023-11-29 DIAGNOSIS — Z87.442 PERSONAL HISTORY OF URINARY CALCULI: ICD-10-CM

## 2023-11-29 PROCEDURE — 74176 CT ABD & PELVIS W/O CONTRAST: CPT

## 2024-11-27 ENCOUNTER — APPOINTMENT (OUTPATIENT)
Dept: URGENT CARE | Facility: CLINIC | Age: 34
End: 2024-11-27

## 2025-03-18 ENCOUNTER — APPOINTMENT (OUTPATIENT)
Dept: RADIOLOGY | Facility: MEDICAL CENTER | Age: 35
End: 2025-03-18
Attending: UROLOGY
Payer: COMMERCIAL

## 2025-03-28 ENCOUNTER — HOSPITAL ENCOUNTER (OUTPATIENT)
Dept: RADIOLOGY | Facility: MEDICAL CENTER | Age: 35
End: 2025-03-28
Attending: UROLOGY
Payer: COMMERCIAL

## 2025-03-28 DIAGNOSIS — N20.0 CALCULUS OF KIDNEY: ICD-10-CM

## 2025-03-28 PROCEDURE — 74176 CT ABD & PELVIS W/O CONTRAST: CPT

## 2025-06-19 ENCOUNTER — HOSPITAL ENCOUNTER (OUTPATIENT)
Facility: MEDICAL CENTER | Age: 35
End: 2025-06-19
Attending: UROLOGY
Payer: COMMERCIAL

## 2025-06-19 LAB
APPEARANCE UR: CLEAR
BILIRUB UR QL STRIP.AUTO: NEGATIVE
COLOR UR: YELLOW
GLUCOSE UR STRIP.AUTO-MCNC: NEGATIVE MG/DL
KETONES UR STRIP.AUTO-MCNC: NEGATIVE MG/DL
LEUKOCYTE ESTERASE UR QL STRIP.AUTO: NEGATIVE
MICRO URNS: NORMAL
NITRITE UR QL STRIP.AUTO: NEGATIVE
PH UR STRIP.AUTO: 6.5 [PH] (ref 5–8)
PROT UR QL STRIP: NEGATIVE MG/DL
RBC UR QL AUTO: NEGATIVE
SP GR UR STRIP.AUTO: 1.02
UROBILINOGEN UR STRIP.AUTO-MCNC: 0.2 EU/DL

## 2025-06-19 PROCEDURE — 81003 URINALYSIS AUTO W/O SCOPE: CPT
